# Patient Record
Sex: MALE | Race: WHITE | NOT HISPANIC OR LATINO | URBAN - METROPOLITAN AREA
[De-identification: names, ages, dates, MRNs, and addresses within clinical notes are randomized per-mention and may not be internally consistent; named-entity substitution may affect disease eponyms.]

---

## 2017-05-15 ENCOUNTER — EMERGENCY (EMERGENCY)
Facility: HOSPITAL | Age: 62
LOS: 1 days | Discharge: HOME | End: 2017-05-15
Attending: EMERGENCY MEDICINE | Admitting: EMERGENCY MEDICINE
Payer: MEDICAID

## 2017-05-15 VITALS
RESPIRATION RATE: 14 BRPM | HEART RATE: 93 BPM | DIASTOLIC BLOOD PRESSURE: 85 MMHG | TEMPERATURE: 98 F | OXYGEN SATURATION: 96 % | SYSTOLIC BLOOD PRESSURE: 135 MMHG

## 2017-05-15 VITALS
HEIGHT: 72 IN | OXYGEN SATURATION: 96 % | RESPIRATION RATE: 14 BRPM | TEMPERATURE: 99 F | WEIGHT: 160.06 LBS | HEART RATE: 98 BPM | SYSTOLIC BLOOD PRESSURE: 148 MMHG | DIASTOLIC BLOOD PRESSURE: 96 MMHG

## 2017-05-15 DIAGNOSIS — F17.210 NICOTINE DEPENDENCE, CIGARETTES, UNCOMPLICATED: ICD-10-CM
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
## 2017-05-15 DIAGNOSIS — S02.2XXA FRACTURE OF NASAL BONES, INITIAL ENCOUNTER FOR CLOSED FRACTURE: ICD-10-CM

## 2017-05-15 DIAGNOSIS — S09.92XA UNSPECIFIED INJURY OF NOSE, INITIAL ENCOUNTER: ICD-10-CM
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
## 2017-05-15 PROCEDURE — 99283 EMERGENCY DEPT VISIT LOW MDM: CPT

## 2017-05-15 PROCEDURE — 70160 X-RAY EXAM OF NASAL BONES: CPT | Mod: 26

## 2017-05-15 PROCEDURE — 70160 X-RAY EXAM OF NASAL BONES: CPT

## 2017-05-15 PROCEDURE — 99283 EMERGENCY DEPT VISIT LOW MDM: CPT | Mod: 25

## 2017-05-15 RX ORDER — ACETAMINOPHEN 500 MG
650 TABLET ORAL ONCE
Qty: 0 | Refills: 0 | Status: COMPLETED | OUTPATIENT
Start: 2017-05-15 | End: 2017-05-15

## 2017-05-15 RX ADMIN — Medication 650 MILLIGRAM(S): at 16:42

## 2017-05-15 RX ADMIN — Medication 650 MILLIGRAM(S): at 16:21
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
## 2017-05-15 NOTE — ED PROVIDER NOTE - PROGRESS NOTE DETAILS
silver nitrate stick applied to small tear left inner nostril, patient tolerated procedure well, xray result reviewed, advised follow up with ENT and PMD tomorrow
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
## 2018-08-06 ENCOUNTER — EMERGENCY (EMERGENCY)
Facility: HOSPITAL | Age: 63
LOS: 1 days | Discharge: HOME | End: 2018-08-06
Attending: EMERGENCY MEDICINE
Payer: MEDICAID

## 2018-08-06 VITALS
WEIGHT: 250 LBS | OXYGEN SATURATION: 93 % | HEIGHT: 72 IN | HEART RATE: 110 BPM | RESPIRATION RATE: 18 BRPM | DIASTOLIC BLOOD PRESSURE: 82 MMHG | TEMPERATURE: 99 F | SYSTOLIC BLOOD PRESSURE: 127 MMHG

## 2018-08-06 PROCEDURE — 99283 EMERGENCY DEPT VISIT LOW MDM: CPT

## 2018-08-06 RX ORDER — NEOMYCIN/POLYMYXIN B/HYDROCORT
2 SUSPENSION, DROPS(FINAL DOSAGE FORM)(ML) OTIC (EAR)
Qty: 1 | Refills: 0 | OUTPATIENT
Start: 2018-08-06 | End: 2018-08-12
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
## 2018-08-06 NOTE — ED ADULT NURSE NOTE - NSIMPLEMENTINTERV_GEN_ALL_ED
Implemented All Universal Safety Interventions:  [place] to call system. Call bell, personal items and telephone within reach. Instruct patient to call for assistance. Room bathroom lighting operational. Non-slip footwear when patient is off stretcher. Physically safe environment: no spills, clutter or unnecessary equipment. Stretcher in lowest position, wheels locked, appropriate side rails in place.

## 2018-08-06 NOTE — ED ADULT NURSE NOTE - CHPI ED NUR SYMPTOMS NEG
no chills/no vomiting/no bleeding gums/no loss of consciousness/no fever/no nausea/no numbness/no syncope/no weakness

## 2018-08-06 NOTE — ED PROVIDER NOTE - PROGRESS NOTE DETAILS
insect removed from right ear, rx for cortisporin otic sent to pharmacy, given information for Dr Mcdonald ENT, advised call tomorrow to arrange follow up

## 2018-08-06 NOTE — ED PROVIDER NOTE - OBJECTIVE STATEMENT
63 y male presents with states a bug flew into his ear 1 hour ago, states he went home tried to wash it out with water, but did not work.  PMD Dr EMMY Babin  + smoker

## 2018-08-06 NOTE — ED ADULT NURSE NOTE - OBJECTIVE STATEMENT
Pt A&Ox4, ambulatory to ED c/o bug in his right ear.  Pt states he felt insect crawling but it flew into his ear.

## 2019-03-05 NOTE — ED ADULT NURSE NOTE - NS ED NOTE ABUSE RESPONSE YN
Type 2 diabetes mellitus Diabetes mellitus Diabetes mellitus Diabetes mellitus Diabetes mellitus Diabetes mellitus Immunosuppression Immunosuppression Immunosuppression no

## 2020-11-29 NOTE — ED ADULT TRIAGE NOTE - ESI TRIAGE ACUITY LEVEL, MLM
PCL spoke with AM who said they do not feel pt. is medically cleared from polysubstance withdrawal.  PCL rec'd call from Alexandra requesting another EKG, current vitals, and COW score. PCL relayed these requests to RN (Stephane) and PCL will fax.  Plan will likely be to contact [facility] and if they could accept the pt., then reassess Mon AM for possible D/C to [facility].   4

## 2021-09-24 NOTE — ED ADULT NURSE NOTE - PRIMARY CARE PROVIDER
Pt + covid is here from the tent after antibody infusion with c/o BLE pain.  Pt was advised to come to r/o DVT
MD Olaf BOOTH

## 2023-05-03 NOTE — ED PROVIDER NOTE - PMH
Pharmacist Admission Medication History    Admission medication history is complete. The information provided in this note is only as accurate as the sources available at the time of the update.    Medication reconciliation/reorder completed by provider prior to medication history? No    Information Source(s): Family member via phone      Changes made to PTA medication list:    Added: None    Deleted: None    Changed: None    Medication Affordability:       Allergies reviewed with patient and updates made in EHR: yes    Medication History Completed By: Татьяна Acharya [facility] 5/2/2023 7:59 PM    Prior to Admission medications    Medication Sig Last Dose Taking? Auth Provider Long Term End Date   acetaminophen (TYLENOL) 325 MG/10.15ML solution Take 15 mLs (480 mg) by mouth every 6 hours as needed for mild pain or fever  Yes Aspen Ochoa APRN CNP No    cetirizine (ZYRTEC) 10 MG tablet Take 10 mg by mouth daily as needed for allergies Past Week Yes Unknown, Entered By History     guanFACINE (INTUNIV) 1 MG TB24 24 hr tablet Take 1 mg by mouth At Bedtime Past Week Yes Unknown, Entered By History     lisdexamfetamine (VYVANSE) 40 MG capsule Take 40 mg by mouth every morning 5/1/2023 Yes Unknown, Entered By History     melatonin 3 MG tablet Take 3 mg by mouth nightly as needed for sleep Past Week Yes Unknown, Entered By History     Pediatric Multivit-Minerals (MULTIVITAMIN CHILDRENS GUMMIES) CHEW Take 1 chew tab by mouth daily Past Week Yes Unknown, Entered By History     polyethylene glycol (MIRALAX) 17 g packet Take 1 packet by mouth daily as needed for constipation Past Week Yes Unknown, Entered By History     polyethylene glycol (MIRALAX) 17 GM/Dose powder Take 17 g by mouth daily  Yes Aspen Ochoa APRN CNP          No pertinent past medical history